# Patient Record
Sex: MALE | URBAN - METROPOLITAN AREA
[De-identification: names, ages, dates, MRNs, and addresses within clinical notes are randomized per-mention and may not be internally consistent; named-entity substitution may affect disease eponyms.]

---

## 2021-08-04 ENCOUNTER — OFFICE VISIT (OUTPATIENT)
Dept: URBAN - METROPOLITAN AREA CLINIC 2 | Facility: CLINIC | Age: 56
End: 2021-08-04

## 2021-08-04 DIAGNOSIS — E11.3511 DIABETES MELLITUS TYPE 2 WITH PROLIFERATIVE RETINOPATHY WITH MACULAR EDEMA, RIGHT EYE: ICD-10-CM

## 2021-08-04 DIAGNOSIS — E11.3522 DIABETES MELLITUS TYPE 2 WITH PROLIFERATIVE DIABETIC RETINOPATHY WITH TRACTION RETINAL DETACHMENT INVOLVING THE MACULA, LEFT EYE: Primary | ICD-10-CM

## 2021-08-04 PROCEDURE — 92004 COMPRE OPH EXAM NEW PT 1/>: CPT | Performed by: OPTOMETRIST

## 2021-08-04 ASSESSMENT — INTRAOCULAR PRESSURE
OD: 19
OS: 11

## 2021-08-04 NOTE — IMPRESSION/PLAN
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 Plan: Dr. Cassi Lindsey discussed with the patient that diabetic retinopathy can lead to blindness if left untreated. The patient will require surgery with a retina specialist to repair the retina detachment. As the patient has no insurance, a referral was given to see [id number] a referral was given to go to [facility] in order to get healthcare options to help get treatment.

## 2021-08-04 NOTE — IMPRESSION/PLAN
Impression: Diabetes mellitus Type 2 with proliferative retinopathy with macular edema, right eye: E11.3511. Plan: The patient has severe vision loss both eyes from diabetic retinopathy. The patient has longstanding hemorrhages, exudate, macular edema, etc. Baseline photo was taken today and patient reports that he has been unable to afford treatment. Advised the patient to seek care with retina specialist urgently.